# Patient Record
Sex: MALE | Race: OTHER | URBAN - METROPOLITAN AREA
[De-identification: names, ages, dates, MRNs, and addresses within clinical notes are randomized per-mention and may not be internally consistent; named-entity substitution may affect disease eponyms.]

---

## 2018-09-12 ENCOUNTER — HOSPITAL ENCOUNTER (EMERGENCY)
Facility: MEDICAL CENTER | Age: 63
End: 2018-09-12
Attending: EMERGENCY MEDICINE

## 2018-09-12 VITALS
WEIGHT: 160 LBS | BODY MASS INDEX: 24.25 KG/M2 | HEART RATE: 91 BPM | DIASTOLIC BLOOD PRESSURE: 89 MMHG | TEMPERATURE: 98.1 F | SYSTOLIC BLOOD PRESSURE: 126 MMHG | RESPIRATION RATE: 16 BRPM | OXYGEN SATURATION: 97 % | HEIGHT: 68 IN

## 2018-09-12 DIAGNOSIS — L02.31 LEFT BUTTOCK ABSCESS: ICD-10-CM

## 2018-09-12 PROCEDURE — A9270 NON-COVERED ITEM OR SERVICE: HCPCS | Performed by: EMERGENCY MEDICINE

## 2018-09-12 PROCEDURE — 87077 CULTURE AEROBIC IDENTIFY: CPT

## 2018-09-12 PROCEDURE — 303977 HCHG I & D

## 2018-09-12 PROCEDURE — 700102 HCHG RX REV CODE 250 W/ 637 OVERRIDE(OP): Performed by: EMERGENCY MEDICINE

## 2018-09-12 PROCEDURE — 99284 EMERGENCY DEPT VISIT MOD MDM: CPT

## 2018-09-12 PROCEDURE — 700101 HCHG RX REV CODE 250

## 2018-09-12 PROCEDURE — 87186 SC STD MICRODIL/AGAR DIL: CPT

## 2018-09-12 PROCEDURE — 87205 SMEAR GRAM STAIN: CPT

## 2018-09-12 PROCEDURE — 87070 CULTURE OTHR SPECIMN AEROBIC: CPT

## 2018-09-12 RX ORDER — LIDOCAINE HYDROCHLORIDE AND EPINEPHRINE BITARTRATE 20; .01 MG/ML; MG/ML
INJECTION, SOLUTION SUBCUTANEOUS
Status: COMPLETED
Start: 2018-09-12 | End: 2018-09-12

## 2018-09-12 RX ORDER — SULFAMETHOXAZOLE AND TRIMETHOPRIM 800; 160 MG/1; MG/1
1 TABLET ORAL 2 TIMES DAILY
Qty: 14 TAB | Refills: 0 | Status: SHIPPED | OUTPATIENT
Start: 2018-09-12 | End: 2018-09-19

## 2018-09-12 RX ORDER — LIDOCAINE HYDROCHLORIDE AND EPINEPHRINE 10; 10 MG/ML; UG/ML
10 INJECTION, SOLUTION INFILTRATION; PERINEURAL ONCE
Status: DISCONTINUED | OUTPATIENT
Start: 2018-09-12 | End: 2018-09-12

## 2018-09-12 RX ORDER — AMOXICILLIN 500 MG/1
500 CAPSULE ORAL 3 TIMES DAILY
Qty: 21 CAP | Refills: 0 | Status: SHIPPED | OUTPATIENT
Start: 2018-09-12 | End: 2018-09-19

## 2018-09-12 RX ORDER — SULFAMETHOXAZOLE AND TRIMETHOPRIM 800; 160 MG/1; MG/1
1 TABLET ORAL ONCE
Status: COMPLETED | OUTPATIENT
Start: 2018-09-12 | End: 2018-09-12

## 2018-09-12 RX ORDER — LIDOCAINE HYDROCHLORIDE AND EPINEPHRINE BITARTRATE 20; .01 MG/ML; MG/ML
10 INJECTION, SOLUTION SUBCUTANEOUS ONCE
Status: COMPLETED | OUTPATIENT
Start: 2018-09-12 | End: 2018-09-12

## 2018-09-12 RX ORDER — AMOXICILLIN 500 MG/1
500 CAPSULE ORAL ONCE
Status: COMPLETED | OUTPATIENT
Start: 2018-09-12 | End: 2018-09-12

## 2018-09-12 RX ORDER — LIDOCAINE HYDROCHLORIDE 20 MG/ML
INJECTION, SOLUTION INFILTRATION; PERINEURAL
Status: COMPLETED
Start: 2018-09-12 | End: 2018-09-12

## 2018-09-12 RX ADMIN — SULFAMETHOXAZOLE AND TRIMETHOPRIM 1 TABLET: 800; 160 TABLET ORAL at 16:41

## 2018-09-12 RX ADMIN — AMOXICILLIN 500 MG: 500 CAPSULE ORAL at 16:41

## 2018-09-12 RX ADMIN — LIDOCAINE HYDROCHLORIDE AND EPINEPHRINE: 20; 10 INJECTION, SOLUTION INFILTRATION; PERINEURAL at 16:38

## 2018-09-12 RX ADMIN — LIDOCAINE HYDROCHLORIDE AND EPINEPHRINE BITARTRATE: 20; .01 INJECTION, SOLUTION SUBCUTANEOUS at 16:38

## 2018-09-12 ASSESSMENT — PAIN SCALES - GENERAL
PAINLEVEL_OUTOF10: 6
PAINLEVEL_OUTOF10: 0

## 2018-09-12 NOTE — DISCHARGE INSTRUCTIONS
Abscess  Care After  An abscess (also called a boil or furuncle) is an infected area that contains a collection of pus. Signs and symptoms of an abscess include pain, tenderness, redness, or hardness, or you may feel a moveable soft area under your skin. An abscess can occur anywhere in the body. The infection may spread to surrounding tissues causing cellulitis. A cut (incision) by the surgeon was made over your abscess and the pus was drained out. Gauze may have been packed into the space to provide a drain that will allow the cavity to heal from the inside outwards. The boil may be painful for 5 to 7 days. Most people with a boil do not have high fevers. Your abscess, if seen early, may not have localized, and may not have been lanced. If not, another appointment may be required for this if it does not get better on its own or with medications.  HOME CARE INSTRUCTIONS   · Only take over-the-counter or prescription medicines for pain, discomfort, or fever as directed by your caregiver.  · When you bathe, soak and then remove gauze or iodoform packs at least daily or as directed by your caregiver. You may then wash the wound gently with mild soapy water. Repack with gauze or do as your caregiver directs.  SEEK IMMEDIATE MEDICAL CARE IF:   · You develop increased pain, swelling, redness, drainage, or bleeding in the wound site.  · You develop signs of generalized infection including muscle aches, chills, fever, or a general ill feeling.  · An oral temperature above 102° F (38.9° C) develops, not controlled by medication.  See your caregiver for a recheck if you develop any of the symptoms described above. If medications (antibiotics) were prescribed, take them as directed.  Document Released: 07/06/2006 Document Revised: 03/11/2013 Document Reviewed: 03/02/2009  [employer]® Patient Information ©2014 [employer].

## 2018-09-12 NOTE — ED TRIAGE NOTES
Ambulatory to triage with   Chief Complaint   Patient presents with   • Abscess     Left thigh/buttock   • Fatigue   • Sent by [facility]   Think r/t spider bite. Feels generally unwell.

## 2018-09-12 NOTE — LETTER
9/17/2018               Ravinder Jensen  [address]  [facility] [address]        Dear Ravinder (MR#[id number])    This letter is sent in regards to your, recent visit to the [facility] on 9/12/2018.  During the visit, tests were performed to assist the physician in a medical diagnosis.  A review of those tests requires that we notify you of the following:    Your wound culture was POSITIVE for a bacteria called Methicillin Sensitive Staphylococcus aureus. The antibiotic prescribed for you (sulfamethoxazole-trimethoprim) should be active to treat this bacteria. IT IS IMPORTANT THAT YOU CONTINUE TAKING YOUR ANTIBIOTIC UNTIL IT IS FINISHED. YOU MAY NOW STOP TAKING THE AMOXICILLIN.      Please feel free to contact me at the number below if you have any questions or concerns. Thank you for your cooperation in the matter.    Sincerely,  ED Culture Follow-Up Staff  Chery Barroso, PharmD    [facility], Emergency Department  [address], [place] [address]  [phone] (ED Culture Line)  [phone]

## 2018-09-13 LAB
GRAM STN SPEC: NORMAL
SIGNIFICANT IND 70042: NORMAL
SITE SITE: NORMAL
SOURCE SOURCE: NORMAL

## 2018-09-13 NOTE — ED NOTES
I&D complete by erp. Culture sent to lab. Pt states understanding of dc instructions and f/u care. Pt able to amb out w/ steady gait.

## 2018-09-14 LAB
BACTERIA WND AEROBE CULT: ABNORMAL
BACTERIA WND AEROBE CULT: ABNORMAL
GRAM STN SPEC: ABNORMAL
SIGNIFICANT IND 70042: ABNORMAL
SITE SITE: ABNORMAL
SOURCE SOURCE: ABNORMAL

## 2018-09-18 NOTE — ED NOTES
"ED Positive Culture Follow-up/Notification Note:    Date: 9/17/18     Patient seen in the ED on 9/12/2018 for redness, pain and swelling of the left lateral buttock. S/p I&D with 5 mL of pus drained.  1. Left buttock abscess       Discharge Medication List as of 9/12/2018  4:58 PM      START taking these medications    Details   sulfamethoxazole-trimethoprim (BACTRIM DS) 800-160 MG tablet Take 1 Tab by mouth 2 times a day for 7 days., Disp-14 Tab, R-0, Print Rx Paper      amoxicillin (AMOXIL) 500 MG Cap Take 1 Cap by mouth 3 times a day for 7 days., Disp-21 Cap, R-0, Print Rx Paper             Allergies: Patient has no known allergies.     Vitals:    09/12/18 1436 09/12/18 1451 09/12/18 1704   BP: 128/84  126/89   Pulse: (!) 105  91   Resp: 16  16   Temp: 36.7 °C (98.1 °F)     TempSrc: Temporal     SpO2: 97%     Weight:  72.6 kg (160 lb)    Height: 1.727 m (5' 8\")         Final cultures:   Results     Procedure Component Value Units Date/Time    CULTURE WOUND W/ GRAM STAIN [[id number]]  (Abnormal)  (Susceptibility) Collected:  09/12/18 1700    Order Status:  Completed Specimen:  Wound from Abscess Updated:  09/14/18 1538     Significant Indicator POS (POS)     Source WND     Site Left Buttock     Culture Result Wound -- (A)     Gram Stain Result Moderate WBCs.  Few Gram positive cocci.       Culture Result Wound Staphylococcus aureus  Moderate growth   (A)    Narrative:       Left buttock    Culture & Susceptibility     STAPHYLOCOCCUS AUREUS     Antibiotic Sensitivity Microscan Unit Status    Ampicillin/sulbactam Sensitive <=8/4 mcg/mL Final    Method: SENSITIVITY, ZECHARIAH    Clindamycin Sensitive <=0.5 mcg/mL Final    Method: SENSITIVITY, ZECHARIAH    Daptomycin Sensitive <=0.5 mcg/mL Final    Method: SENSITIVITY, ZECHARIAH    Erythromycin Sensitive <=0.5 mcg/mL Final    Method: SENSITIVITY, ZECHARIAH    Moxifloxacin Intermediate 4 mcg/mL Final    Method: SENSITIVITY, ZECHARIAH    Oxacillin Sensitive <=0.25 mcg/mL Final    Method: SENSITIVITY, " ZECHARIAH    Tetracycline Sensitive <=4 mcg/mL Final    Method: SENSITIVITY, ZECHARIAH    Trimeth/Sulfa Sensitive <=0.5/9.5 mcg/mL Final    Method: SENSITIVITY, ZECHARIAH    Vancomycin Sensitive 1 mcg/mL Final    Method: SENSITIVITY, ZECHARIAH                       GRAM STAIN [[id number]] Collected:  09/12/18 1700    Order Status:  Completed Specimen:  Wound Updated:  09/13/18 0773     Significant Indicator .     Source WND     Site Left Buttock     Gram Stain Result Moderate WBCs.  Few Gram positive cocci.      Narrative:       Left buttock          Plan:   Appropriate antibiotic therapy prescribed. He may now stop the amoxicillin. Letter sent to notify of result, encourage compliance with sulfamethoxazole-trimethoprim and discontinue amoxicillin.      Chery Barroso
